# Patient Record
Sex: FEMALE
[De-identification: names, ages, dates, MRNs, and addresses within clinical notes are randomized per-mention and may not be internally consistent; named-entity substitution may affect disease eponyms.]

---

## 2024-06-07 ENCOUNTER — APPOINTMENT (OUTPATIENT)
Dept: DERMATOLOGY | Facility: CLINIC | Age: 44
End: 2024-06-07
Payer: COMMERCIAL

## 2024-06-07 VITALS — WEIGHT: 119.05 LBS | BODY MASS INDEX: 20.08 KG/M2 | HEIGHT: 64.57 IN

## 2024-06-07 DIAGNOSIS — L85.3 XEROSIS CUTIS: ICD-10-CM

## 2024-06-07 DIAGNOSIS — L50.8 OTHER URTICARIA: ICD-10-CM

## 2024-06-07 DIAGNOSIS — L20.9 ATOPIC DERMATITIS, UNSPECIFIED: ICD-10-CM

## 2024-06-07 PROBLEM — Z00.00 ENCOUNTER FOR PREVENTIVE HEALTH EXAMINATION: Status: ACTIVE | Noted: 2024-06-07

## 2024-06-07 PROCEDURE — 99204 OFFICE O/P NEW MOD 45 MIN: CPT

## 2024-06-07 RX ORDER — HYDROCORTISONE 25 MG/G
2.5 CREAM TOPICAL
Qty: 1 | Refills: 1 | Status: ACTIVE | COMMUNITY
Start: 2024-06-07 | End: 1900-01-01

## 2024-06-07 NOTE — HISTORY OF PRESENT ILLNESS
[FreeTextEntry1] : rash [de-identified] : 42yo F presents for evaluation of rash  ID [id number]  hx of Hashimoto's disease, on levothyroxine, had labs checked ~ 6 mos ago, wnl last month had episode of eye swelling bilaterally, also with itchy rash on the neck. Eyelids were painful, not itchy was prescribed tacrolimus ointment, overall improving but still flaky and dry on the eyelids denies hx of eczema or asthma has hx of seasonal allergies and previously had egg allergy recently moved here from [place] 6 months ago  also having hives on the body that come and go taking xyzal daily at night, denies drowsiness  does not take any other medications  using fragrance free soaps, products

## 2024-06-07 NOTE — PHYSICAL EXAM
[Alert] : alert [Oriented x 3] : ~L oriented x 3 [Declined] : declined [FreeTextEntry3] : Focused exam: -few pink papules on the thighs -bl upper eyelids with xerosis -scaly pink patches on the neck

## 2024-06-07 NOTE — ASSESSMENT
[FreeTextEntry1] : #Atopic dermatitis - eyelids, neck #Xerosis -chronic, flaring -I have discussed the chronic nature and course of this condition -continue protopic ointment bid to affected areas on the eyelids until resolved -start hytone 2.5% cream bid to affected areas on the neck x 1-2 weeks then protopic ointment until resolved -gentle skin care, liberal emollients reviewed  #Acute urticaria -chronic, flaring -I have discussed the chronic nature and course of this condition -start allegra 180mg in AM, claritin or zyrtec at 12pm, and continue xyzal at night   RTC 1 month

## 2024-07-19 ENCOUNTER — APPOINTMENT (OUTPATIENT)
Dept: DERMATOLOGY | Facility: CLINIC | Age: 44
End: 2024-07-19
Payer: COMMERCIAL

## 2024-07-19 DIAGNOSIS — L20.9 ATOPIC DERMATITIS, UNSPECIFIED: ICD-10-CM

## 2024-07-19 DIAGNOSIS — L85.3 XEROSIS CUTIS: ICD-10-CM

## 2024-07-19 DIAGNOSIS — L50.8 OTHER URTICARIA: ICD-10-CM

## 2024-07-19 PROCEDURE — 99214 OFFICE O/P EST MOD 30 MIN: CPT

## 2024-07-19 RX ORDER — TACROLIMUS 1 MG/G
0.1 OINTMENT TOPICAL
Qty: 1 | Refills: 1 | Status: ACTIVE | COMMUNITY
Start: 2024-07-19 | End: 1900-01-01